# Patient Record
Sex: MALE | Race: WHITE
[De-identification: names, ages, dates, MRNs, and addresses within clinical notes are randomized per-mention and may not be internally consistent; named-entity substitution may affect disease eponyms.]

---

## 2018-03-03 ENCOUNTER — HOSPITAL ENCOUNTER (EMERGENCY)
Dept: HOSPITAL 41 - JD.ED | Age: 48
Discharge: PSYCHIATRIC OR SUBSTANCE USE FACILITY | End: 2018-03-03
Payer: SELF-PAY

## 2018-03-03 DIAGNOSIS — S01.512A: ICD-10-CM

## 2018-03-03 DIAGNOSIS — I10: ICD-10-CM
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
## 2018-03-03 DIAGNOSIS — R56.9: Primary | ICD-10-CM

## 2018-03-03 DIAGNOSIS — F10.230: ICD-10-CM

## 2018-03-03 PROCEDURE — 99285 EMERGENCY DEPT VISIT HI MDM: CPT

## 2018-03-03 PROCEDURE — 96375 TX/PRO/DX INJ NEW DRUG ADDON: CPT

## 2018-03-03 PROCEDURE — 96374 THER/PROPH/DIAG INJ IV PUSH: CPT

## 2018-03-03 PROCEDURE — 36415 COLL VENOUS BLD VENIPUNCTURE: CPT

## 2018-03-03 PROCEDURE — 82962 GLUCOSE BLOOD TEST: CPT

## 2018-03-03 PROCEDURE — 96376 TX/PRO/DX INJ SAME DRUG ADON: CPT

## 2018-03-03 PROCEDURE — 85025 COMPLETE CBC W/AUTO DIFF WBC: CPT

## 2018-03-03 PROCEDURE — 80306 DRUG TEST PRSMV INSTRMNT: CPT

## 2018-03-03 PROCEDURE — 96361 HYDRATE IV INFUSION ADD-ON: CPT

## 2018-03-03 PROCEDURE — 80053 COMPREHEN METABOLIC PANEL: CPT

## 2018-03-03 PROCEDURE — G0480 DRUG TEST DEF 1-7 CLASSES: HCPCS

## 2018-03-03 PROCEDURE — 70450 CT HEAD/BRAIN W/O DYE: CPT

## 2018-03-03 NOTE — EDM.PDOC
ED HPI GENERAL MEDICAL PROBLEM





- General


Chief Complaint: Neurological Problem


Stated Complaint: [facility]


Time Seen by Provider: 18 12:41


Source of Information: Reports: Patient


History Limitations: Reports: No Limitations





- History of Present Illness


INITIAL COMMENTS - FREE TEXT/NARRATIVE: 





The patient presents by [facility] for a seizure.  He was found 

outside in [place] and he had a seizure.  He had 2 of them on the way.  He has 

no history of seizures.  He does drink alcohol daily.  He last drank last 

night.  He has no medical problems.  He is alert and orientated when he arrived 

here.  He did bite his tongue.  He denies headache, neck pain, chest pain, 

shortness of breath, abdominal pain, or vomiting.


Onset: Sudden


Duration: Minutes:


Location: Reports: Generalized


Severity: Moderate


Improves with: Reports: None


Worsens with: Reports: None


Associated Symptoms: Reports: Nausea/Vomiting.  Denies: Chest Pain, Cough, Fever

/Chills, Headaches, Shortness of Breath





- Related Data


 Allergies











Allergy/AdvReac Type Severity Reaction Status Date / Time


 


No Known Allergies Allergy   Verified 18 12:42











Home Meds: 


 Home Meds





. [No Known Home Meds]  18 [History]











Past Medical History


Other HEENT History: blood in mouth, no obvious bite /injury to tounge noted.


Cardiovascular History: Reports: Hypertension





- Past Surgical History


Other Cardiovascular Surgeries/Procedures: States hx of HTN about one year ago 

but not put on any medication for it.





Social & Family History





- Family History


Family Medical History: Noncontributory





- Tobacco Use


Smoking Status *Q: Never Smoker





- Caffeine Use


Caffeine Use: Reports: Energy Drinks, Soda





- Alcohol Use


Date of Last Drink: 18





- Recreational Drug Use


Recreational Drug Use: Yes


Drug Use in Last 12 Months: No


Recreational Drug Type: Reports: Methamphetamine


Recreational Drug Use Frequency: Not Used In Over 6 Months





ED ROS GENERAL





- Review of Systems


Review Of Systems: See Below


Constitutional: Reports: No Symptoms


HEENT: Reports: No Symptoms


Respiratory: Reports: No Symptoms


Cardiovascular: Reports: No Symptoms


Endocrine: Reports: No Symptoms


GI/Abdominal: Reports: No Symptoms


: Reports: No Symptoms


Musculoskeletal: Reports: No Symptoms


Skin: Reports: No Symptoms


Neurological: Reports: Seizure





- Physical Exam


Exam: See Below


Exam Limited By: No Limitations


General Appearance: Alert, No Apparent Distress


Ears: Normal External Exam


Nose: Normal Inspection


Throat/Mouth: Other (Superficial laceration to the left side of his tongue)


Head Exam: Atraumatic, Normocephalic


Neck: Normal Inspection


Respiratory/Chest: No Respiratory Distress, Lungs Clear, Normal Breath Sounds


Cardiovascular: Regular Rate, Rhythm, No Edema, No Murmur


GI/Abdominal: Soft, Non-Tender, No Organomegaly, No Mass


Neuro Exam (Abbreviated): Alert, Oriented, No Motor/Sensory Deficits





Course





- Vital Signs


Last Recorded V/S: 


 Last Vital Signs











Temp  97.1 F   18 12:44


 


Pulse  161 H  18 12:44


 


Resp  27 H  18 12:44


 


BP  183/116 H  18 12:44


 


Pulse Ox  94 L  18 12:44














- Orders/Labs/Meds


Orders: 


 Active Orders 24 hr











 Category Date Time Status


 


 Peripheral IV Care [RC] .AS DIRECTED Care  18 12:42 Active


 


 Sodium Chloride 0.9% [Normal Saline] 1,000 ml Med  18 15:45 Active





 IV ASDIRECTED   


 


 Sodium Chloride 0.9% [Saline Flush] Med  18 12:42 Active





 10 ml FLUSH ASDIRECTED PRN   


 


 ED Antiemetic Medication Reflex [OM.PC] Stat Oth  18 12:42 Ordered


 


 Peripheral IV Insertion Adult [OM.PC] Stat Oth  18 12:42 Ordered








 Medication Orders





Sodium Chloride (Normal Saline)  1,000 mls @ 150 mls/hr IV ASDIRECTED CHINTAN


   Last Admin: 18 15:46  Dose: 150 mls/hr


Sodium Chloride (Saline Flush)  10 ml FLUSH ASDIRECTED PRN


   PRN Reason: Keep Vein Open


   Last Admin: 18 12:58  Dose: 10 ml








Labs: 


 Laboratory Tests











  18 Range/Units





  12:37 12:37 12:38 


 


WBC  12.80 H    (4.23-9.07)  K/mm3


 


RBC  4.49 L    (4.63-6.08)  M/mm3


 


Hgb  14.8    (13.7-17.5)  gm/L


 


Hct  45.0    (40.1-51.0)  %


 


MCV  100.2 H    (79.0-92.2)  fl


 


MCH  33.0 H    (25.7-32.2)  pg


 


MCHC  32.9    (32.2-35.5)  g/dl


 


RDW Std Deviation  46.9 H    (35.1-43.9)  fL


 


Plt Count  142 L    (163-337)  K/mm3


 


MPV  11.0    (9.4-12.3)  fl


 


Neut % (Auto)  72.8 H    (34.0-67.9)  %


 


Lymph % (Auto)  17.9 L    (21.8-53.1)  %


 


Mono % (Auto)  8.9    (5.3-12.2)  %


 


Eos % (Auto)  0 L    (0.8-7.0)  


 


Baso % (Auto)  0.1    (0.1-1.2)  %


 


Neut # (Auto)  9.32 H    (1.78-5.38)  K/mm3


 


Lymph # (Auto)  2.29    (1.32-3.57)  K/mm3


 


Mono # (Auto)  1.14 H    (0.30-0.82)  K/mm3


 


Eos # (Auto)  0.00 L    (0.04-0.54)  K/mm3


 


Baso # (Auto)  0.01    (0.01-0.08)  K/mm3


 


Sodium   136   (136-145)  mEq/L


 


Potassium   3.6   (3.5-5.1)  mEq/L


 


Chloride   95 L   ()  mEq/L


 


Carbon Dioxide   8 L*   (21-32)  mEq/L


 


Anion Gap   36.6 H   (5-15)  


 


BUN   5 L   (7-18)  mg/dL


 


Creatinine   1.6 H   (0.7-1.3)  mg/dL


 


Est Cr Clr Drug Dosing   65.20   mL/min


 


Estimated GFR (MDRD)   47   (>60)  mL/min


 


BUN/Creatinine Ratio   3.1 L   (14-18)  


 


Glucose   253 H   ()  mg/dL


 


POC Glucose    239 H  ()  mg/dL


 


Calcium   9.3   (8.5-10.1)  mg/dL


 


Total Bilirubin   1.3 H   (0.2-1.0)  mg/dL


 


AST   430 H   (15-37)  U/L


 


ALT   255 H   (16-63)  U/L


 


Alkaline Phosphatase   152 H   ()  U/L


 


Total Protein   8.6 H   (6.4-8.2)  g/dl


 


Albumin   4.0   (3.4-5.0)  g/dl


 


Globulin   4.6   gm/dL


 


Albumin/Globulin Ratio   0.9 L   (1-2)  


 


Urine Opiates Screen     (NEGATIVE)  


 


Ur Buprenorphine Scrn     (NEGATIVE)  


 


Ur Oxycodone Screen     (NEGATIVE)  


 


Urine Methadone Screen     (NEGATIVE)  


 


Ur Propoxyphene Screen     (NEGATIVE)  


 


Ur Barbiturates Screen     (NEGATIVE)  


 


Ur Tricyclics Screen     (NEGATIVE)  


 


Ur Phencyclidine Scrn     (NEGATIVE)  


 


Ur Amphetamine Screen     (NEGATIVE)  


 


U Methamphetamines Scrn     (NEGATIVE)  


 


U Benzodiazepines Scrn     (NEGATIVE)  


 


U Cocaine Metab Screen     (NEGATIVE)  


 


U Marijuana (THC) Screen     (NEGATIVE)  


 


Ethyl Alcohol   0.00   (0.00)  gm%














  18 Range/Units





  13:11 


 


WBC   (4.23-9.07)  K/mm3


 


RBC   (4.63-6.08)  M/mm3


 


Hgb   (13.7-17.5)  gm/L


 


Hct   (40.1-51.0)  %


 


MCV   (79.0-92.2)  fl


 


MCH   (25.7-32.2)  pg


 


MCHC   (32.2-35.5)  g/dl


 


RDW Std Deviation   (35.1-43.9)  fL


 


Plt Count   (163-337)  K/mm3


 


MPV   (9.4-12.3)  fl


 


Neut % (Auto)   (34.0-67.9)  %


 


Lymph % (Auto)   (21.8-53.1)  %


 


Mono % (Auto)   (5.3-12.2)  %


 


Eos % (Auto)   (0.8-7.0)  


 


Baso % (Auto)   (0.1-1.2)  %


 


Neut # (Auto)   (1.78-5.38)  K/mm3


 


Lymph # (Auto)   (1.32-3.57)  K/mm3


 


Mono # (Auto)   (0.30-0.82)  K/mm3


 


Eos # (Auto)   (0.04-0.54)  K/mm3


 


Baso # (Auto)   (0.01-0.08)  K/mm3


 


Sodium   (136-145)  mEq/L


 


Potassium   (3.5-5.1)  mEq/L


 


Chloride   ()  mEq/L


 


Carbon Dioxide   (21-32)  mEq/L


 


Anion Gap   (5-15)  


 


BUN   (7-18)  mg/dL


 


Creatinine   (0.7-1.3)  mg/dL


 


Est Cr Clr Drug Dosing   mL/min


 


Estimated GFR (MDRD)   (>60)  mL/min


 


BUN/Creatinine Ratio   (14-18)  


 


Glucose   ()  mg/dL


 


POC Glucose   ()  mg/dL


 


Calcium   (8.5-10.1)  mg/dL


 


Total Bilirubin   (0.2-1.0)  mg/dL


 


AST   (15-37)  U/L


 


ALT   (16-63)  U/L


 


Alkaline Phosphatase   ()  U/L


 


Total Protein   (6.4-8.2)  g/dl


 


Albumin   (3.4-5.0)  g/dl


 


Globulin   gm/dL


 


Albumin/Globulin Ratio   (1-2)  


 


Urine Opiates Screen  Negative  (NEGATIVE)  


 


Ur Buprenorphine Scrn  Negative  (NEGATIVE)  


 


Ur Oxycodone Screen  Negative  (NEGATIVE)  


 


Urine Methadone Screen  Negative  (NEGATIVE)  


 


Ur Propoxyphene Screen  Negative  (NEGATIVE)  


 


Ur Barbiturates Screen  Negative  (NEGATIVE)  


 


Ur Tricyclics Screen  Negative  (NEGATIVE)  


 


Ur Phencyclidine Scrn  Negative  (NEGATIVE)  


 


Ur Amphetamine Screen  Negative  (NEGATIVE)  


 


U Methamphetamines Scrn  Negative  (NEGATIVE)  


 


U Benzodiazepines Scrn  Negative  (NEGATIVE)  


 


U Cocaine Metab Screen  Negative  (NEGATIVE)  


 


U Marijuana (THC) Screen  Negative  (NEGATIVE)  


 


Ethyl Alcohol   (0.00)  gm%











Meds: 


Medications











Generic Name Dose Route Start Last Admin





  Trade Name Freq  PRN Reason Stop Dose Admin


 


Sodium Chloride  1,000 mls @ 150 mls/hr  18 15:45  18 15:46





  Normal Saline  IV   150 mls/hr





  ASDIRECTED CHINTAN   Administration


 


Sodium Chloride  10 ml  18 12:42  18 12:58





  Saline Flush  FLUSH   10 ml





  ASDIRECTED PRN   Administration





  Keep Vein Open   














Discontinued Medications














Generic Name Dose Route Start Last Admin





  Trade Name Freq  PRN Reason Stop Dose Admin


 


Al Hydroxide/Mg Hydroxide 30  0 ml  18 16:33  18 16:42





  ml/ Lidocaine HCl 15 ml  PO  18 16:34  45 ml





  ONETIME ONE   Administration


 


Sodium Chloride  1,000 mls @ 1,000 mls/hr  18 12:42  18 12:50





  Normal Saline  IV  18 13:41  1,000 mls/hr





  .BOLUS STA   Administration


 


Sodium Chloride  1,000 mls @ 1,000 mls/hr  18 14:07  18 14:16





  Normal Saline  IV  18 15:06  1,000 mls/hr





  ONETIME ONE   Administration


 


Lorazepam  1 mg  18 12:43  18 12:53





  Ativan  IVPUSH  18 12:44  1 mg





  ONETIME ONE   Administration


 


Lorazepam  1 mg  18 14:08  18 15:18





  Ativan  IVPUSH  18 14:09  1 mg





  ONETIME ONE   Administration


 


Lorazepam  1 mg  18 16:32  18 16:40





  Ativan  IVPUSH  18 16:33  1 mg





  ONETIME ONE   Administration


 


Ondansetron HCl  4 mg  18 12:42  18 12:57





  Zofran  IVPUSH  18 12:43  4 mg





  ONETIME ONE   Administration














- Re-Assessments/Exams


Free Text/Narrative Re-Assessment/Exam: 





18 14:14


I ordered an IV NS 1L bolus, ativan 1mg IV, labs, and CT of his head.  His WBC 

was elevated at 12.8.  His platelets are low at 142.  His CO2 is low at 8.  His 

creatinine is elevated at 1.6.  His glucose is elevated at 253.  His total bili 

is elevated at 1.3.  His AST is elevated at 430.  His ALT is elevated at 255.  

His alk phos is elevated at 152.  His UDS is negative.  HIs ETOH is at zero 

now.  His CT of his head shows an arachnoid cyst within the left posterior 

fossa.  Mild generalized atrophy.  No acute intracranial abnormality is 

identified on noncontrast head CT exam.  He is still shaking so I ordered 

ativan 1mg IV and another liter of fluids.


18 17:37


His uncle showed up along with some friends and they are all concerned about 

his drinking.  They are worried he could have  today.  [place] do agree [place] 

[place].  The patient does not want to stop drinking but I feel he 

needs help.  I called [facility] in [place] and talked with Dr Wilburn and she 

accepted the patient.  The patient will go by ambulance and a 's deputy.





Departure





- Departure


Time of Disposition: 18:00


Disposition: DC/[facility] to [facility]


Condition: Serious


Clinical Impression: 


 Seizure





Tongue laceration


Qualifiers:


 Encounter type: initial encounter Qualified Code(s): S01.512A - Laceration 

without foreign body of oral cavity, initial encounter





Alcohol withdrawal


Qualifiers:


 Complication of substance-induced condition: uncomplicated Qualified Code(s): 

F10.230 - Alcohol dependence with withdrawal, uncomplicated








- Discharge Information


Referrals: 


PCP,None [Primary Care Provider] - 


Forms:  ED Department Discharge





- My Orders


Last 24 Hours: 


My Active Orders





18 12:42


Peripheral IV Care [RC] .AS DIRECTED 


Sodium Chloride 0.9% [Saline Flush]   10 ml FLUSH ASDIRECTED PRN 


ED Antiemetic Medication Reflex [OM.PC] Stat 


Peripheral IV Insertion Adult [OM.PC] Stat 





18 15:45


Sodium Chloride 0.9% [Normal Saline] 1,000 ml IV ASDIRECTED 














- Assessment/Plan


Last 24 Hours: 


My Active Orders





18 12:42


Peripheral IV Care [RC] .AS DIRECTED 


Sodium Chloride 0.9% [Saline Flush]   10 ml FLUSH ASDIRECTED PRN 


ED Antiemetic Medication Reflex [OM.PC] Stat 


Peripheral IV Insertion Adult [OM.PC] Stat 





18 15:45


Sodium Chloride 0.9% [Normal Saline] 1,000 ml IV ASDIRECTED

## 2018-03-03 NOTE — CT
Head CT

 

Technique: Multiple axial sections through the brain were obtained.  

Intravenous contrast was not utilized.

 

Comparison: No previous intracranial imaging.

 

Findings: CSF collection is seen extra-axially within the left 

posterior fossa in the area of the left cerebellar hemisphere.  This 

is felt compatible with an arachnoid cyst measuring 4.9 cm by 

approximately 1.6 cm.  Ventricles along the basal cisterns and sulci 

over convexities are mildly prominent.  No abnormal parenchymal 

densities are seen.  No evidence of intracranial hemorrhage.  No 

midline shift or mass effect is seen.

 

Bone window settings were reviewed which shows no acute calvarial 

abnormality.  Visualized sinuses are clear.

 

Impression:

1.  Arachnoid cyst within the left posterior fossa.

2.  Mild generalized atrophy.

3.  No acute intracranial abnormality is identified on noncontrast 

head CT exam.

 

Diagnostic code #2

## 2021-08-29 ENCOUNTER — HOSPITAL ENCOUNTER (EMERGENCY)
Dept: HOSPITAL 41 - JD.ED | Age: 51
Discharge: NURSING, REHAB OR LONG-TERM CARE | End: 2021-08-29
Payer: MEDICAID

## 2021-08-29 DIAGNOSIS — K70.31: Primary | ICD-10-CM

## 2021-08-29 DIAGNOSIS — Z20.822: ICD-10-CM

## 2021-08-29 DIAGNOSIS — Z72.0: ICD-10-CM

## 2021-08-29 DIAGNOSIS — I10: ICD-10-CM

## 2021-08-29 PROCEDURE — 93005 ELECTROCARDIOGRAM TRACING: CPT

## 2021-08-29 PROCEDURE — 87635 SARS-COV-2 COVID-19 AMP PRB: CPT

## 2021-08-29 PROCEDURE — 85610 PROTHROMBIN TIME: CPT

## 2021-08-29 PROCEDURE — 87804 INFLUENZA ASSAY W/OPTIC: CPT

## 2021-08-29 PROCEDURE — 99285 EMERGENCY DEPT VISIT HI MDM: CPT

## 2021-08-29 PROCEDURE — 85025 COMPLETE CBC W/AUTO DIFF WBC: CPT

## 2021-08-29 PROCEDURE — 80053 COMPREHEN METABOLIC PANEL: CPT

## 2021-08-29 PROCEDURE — 82977 ASSAY OF GGT: CPT

## 2021-08-29 PROCEDURE — 74177 CT ABD & PELVIS W/CONTRAST: CPT

## 2021-08-29 PROCEDURE — 85730 THROMBOPLASTIN TIME PARTIAL: CPT

## 2021-08-29 PROCEDURE — 82247 BILIRUBIN TOTAL: CPT

## 2021-08-29 PROCEDURE — 81001 URINALYSIS AUTO W/SCOPE: CPT

## 2021-08-29 PROCEDURE — 86140 C-REACTIVE PROTEIN: CPT

## 2021-08-29 PROCEDURE — 83690 ASSAY OF LIPASE: CPT

## 2021-08-29 PROCEDURE — 82248 BILIRUBIN DIRECT: CPT

## 2021-08-29 PROCEDURE — 96374 THER/PROPH/DIAG INJ IV PUSH: CPT

## 2021-08-29 PROCEDURE — 36415 COLL VENOUS BLD VENIPUNCTURE: CPT

## 2021-08-29 PROCEDURE — 83735 ASSAY OF MAGNESIUM: CPT

## 2021-08-29 PROCEDURE — U0002 COVID-19 LAB TEST NON-CDC: HCPCS

## 2021-08-29 PROCEDURE — 96375 TX/PRO/DX INJ NEW DRUG ADDON: CPT

## 2021-08-29 RX ADMIN — Medication PRN ML: at 14:28

## 2021-08-29 RX ADMIN — Medication PRN ML: at 13:33

## 2021-08-29 NOTE — CT
CT abdomen and pelvis

 

Technique: Multiple axial sections were obtained from above the dome 

of the diaphragm inferiorly through the pubic symphysis.  Intravenous 

and oral contrast were utilized.  Delayed images were also obtained 

through the abdomen and pelvis.  Reconstructed coronal and sagittal 

images were obtained.

 

Comparison: No prior abdominal imaging is available.

 

Findings: Visualized lung bases show nothing acute.

 

Liver is small in size compatible with cirrhosis.  Moderate amount of 

ascites is seen around the upper abdomen extending down the paracolic 

gutters into the pelvis.  Spleen size is normal.  Minimal contrast is 

seen within the esophagus presumably due to reflux.

 

Liver shows no focal parenchymal abnormality.  Gallbladder contains no

 calcified gallstones.  Pancreas is within normal limits.  Adrenal 

glands show no nodule.

 

Kidneys show symmetric contrast enhancement.  No hydronephrosis or 

mass is seen.  Delayed images show contrast excretion from both 

kidneys.  Two ureters are noted on the right side which join distally.

  Single ureter is seen on the left side.  Contrast is noted within 

all ureters and bladder.

 

Abdominal aorta shows no aneurysm.  No retroperitoneal adenopathy or 

discrete mesenteric abnormalities are seen.  Diverticuli are seen 

within the sigmoid colon.  No discrete inflammatory change is seen.  

Appendix is not visualized with certainty.  Slight bowel wall 

thickening is seen within the right colon most likely relating to the 

ascites.

 

Focal fluid collections are seen within the subcutaneous tissues over 

both hips compatible with soft tissue edema.

 

Bone window settings were reviewed which show slight degenerative 

change within the spine.  No acute osseous abnormality is appreciated.

 

Impression:

1.  Small liver compatible with cirrhosis.  Moderate amount of ascites

 is seen.

2.  Small amount of gastroesophageal reflux is noted.

3.  Bowel wall thickening within the right colon most likely relating 

to the patient's ascites.

4.  Other findings believed to be incidental as noted above.

 

Diagnostic code #3

## 2021-08-29 NOTE — EDM.PDOC
ED HPI GENERAL MEDICAL PROBLEM





- General


Chief Complaint: Abdominal Pain


Stated Complaint: constipation swollen ankles and adominal pain


Time Seen by Provider: 08/29/21 12:25


Source of Information: Reports: Patient, RN Notes Reviewed


History Limitations: Reports: No Limitations





- History of Present Illness


INITIAL COMMENTS - FREE TEXT/NARRATIVE: 





Patient is a 51-year-old male who presents to the ER for the evaluation of his 

abdomen discomfort, and leg swelling.  The patient states that he is an everyday

alcohol drinker, and he has been doing this for many years.  States he drinks 

about 6 beers per day and usually never misses a day.  Last alcohol intake was 

yesterday afternoon.  He comes in today because he is concerned about the size 

of his abdomen, and the size of his legs, he states that it is getting harder 

and harder to do daily activities, when he is showering, is hard to bend over to

wash his feet or legs.  He has a very protuberant abdomen, and at least 3-4+ 

pitting edema on his bilateral extremities, that seems to extend up to about his

mid thighs.  He is denying any swelling into his scrotum at this time.  He 

states that he is not short of breath, he is having no cough, no fevers or 

chills.  States that he slightly nauseous but no vomiting or diarrhea.  States 

that he actually feels quite constipated he is not had a normal bowel movement 

in the last 3 weeks or so.  He has been trying to use laxatives, and enemas to 

try to provide bowel movements, but nothing seems to be working.  States that 

the fluid retention seems to have started roughly 2 months ago.  States that his

weight then was around 180 pounds, and now he is up to 200 pounds.  He has no 

regular provider.  He takes no regular medications.


  ** Abdomen


Pain Score (Numeric/FACES): 7





- Related Data


                                    Allergies











Allergy/AdvReac Type Severity Reaction Status Date / Time


 


No Known Allergies Allergy   Verified 03/03/18 12:42











Home Meds: 


                                    Home Meds





. [No Known Home Meds]  03/03/18 [History]











Past Medical History


HEENT History: Reports: Other (See Below)


Other HEENT History: blood in mouth, no obvious bite /injury to tounge noted.


Cardiovascular History: Reports: Hypertension (but not medicated)


Gastrointestinal History: Reports: Jaundice, Other (See Below)


Other Gastrointestinal History: ascites; liver issues


Psychiatric History: Reports: Addiction, Anxiety





- Infectious Disease History


Infectious Disease History: Reports: Chicken Pox





Social & Family History





- Family History


Family Medical History: No Pertinent Family History





- Tobacco Use


Tobacco Use Status *Q: Current Every Day Tobacco User


Years of Tobacco use: 28


Packs/Tins Daily: 0.7





- Caffeine Use


Caffeine Use: Reports: Energy Drinks, Soda





- Alcohol Use


Alcohol Use History: Yes


Days Per Week of Alcohol Use: 7


Number of Drinks Per Day: 6


Total Drinks Per Week: 42


Alcohol Use in Last Twelve Months: Yes


Alcohol Use Frequency: Daily





ED ROS GENERAL





- Review of Systems


Review Of Systems: Comprehensive ROS is negative, except as noted in HPI.





ED EXAM, GI/ABD





- Physical Exam


Exam: See Below


Exam Limited By: No Limitations


General Appearance: Alert, WD/WN, No Apparent Distress


Eyes: Bilateral: EOMI (bilateral eyes with scleral icterus)


Respiratory/Chest: No Respiratory Distress, Lungs Clear, Normal Breath Sounds, 

No Accessory Muscle Use, Chest Non-Tender


Cardiovascular: Normal Peripheral Pulses, Regular Rate, Rhythm, No Edema


GI/Abdominal Exam: Normal Bowel Sounds, Soft, Distended (generalized; abdomen is

 protuberant), Tender (very slightly to palpation-pt states that his whole 

abomen feels tense)


Extremities: Normal Inspection, Normal Capillary Refill


Neurological: Alert, Oriented, Normal Cognition, No Motor/Sensory Deficits


Psychiatric: Normal Affect, Normal Mood


Skin Exam: Warm, Dry, Intact, Normal Color, No Rash


  ** #1 Interpretation


EKG Date: 08/29/21


Time: 13:35


Rhythm: NSR (sinus tach)


Rate (Beats/Min): 101


Axis: LAD-Left Axis Deviation (Borderline IVCD with LAD)


P-Wave: Present


QRS: Normal


ST-T: Normal


QT: Normal


Comparison: NA - No Prior EKG


EKG Interpretation Comments: 





No obvious ischemia or acute ST changes noted, reviewed by myself and Dr. Morales.





Course





- Vital Signs


Last Recorded V/S: 


                                Last Vital Signs











Temp  98.2 F   08/29/21 12:37


 


Pulse  110 H  08/29/21 12:37


 


Resp  20   08/29/21 12:37


 


BP  167/95 H  08/29/21 12:37


 


Pulse Ox  97   08/29/21 12:37














- Orders/Labs/Meds


Orders: 


                               Active Orders 24 hr











 Category Date Time Status


 


 Peripheral IV Care [RC] .AS DIRECTED Care  08/29/21 12:48 Active


 


 INR,PT,PROTHROMBIN TIME [COAG] Stat Lab  08/29/21 15:15 Ordered


 


 MAGNESIUM [CHEM] Stat Lab  08/29/21 15:22 Ordered


 


 PTT,PARTIAL THROMBOPLSTIN TIME [COAG] Stat Lab  08/29/21 15:15 Ordered


 


 Sodium Chloride 0.9% [Saline Flush] Med  08/29/21 12:48 Active





 10 ml FLUSH ASDIRECTED PRN   


 


 Sodium Chloride 0.9% [Saline Flush] Med  08/29/21 13:18 Active





 10 ml FLUSH ONETIME PRN   


 


 Peripheral IV Insertion Adult [OM.PC] Stat Oth  08/29/21 12:48 Ordered








                                Medication Orders





Sodium Chloride (Sodium Chloride 0.9% 10 Ml Syringe)  10 ml FLUSH ASDIRECTED PRN


   PRN Reason: Keep Vein Open


   Last Admin: 08/29/21 13:11  Dose: 10 ml


   Documented by: MICHELE


Sodium Chloride (Sodium Chloride 0.9% 10 Ml Syringe)  10 ml FLUSH ONETIME PRN


   PRN Reason: Keep Vein Open


   Last Admin: 08/29/21 14:28  Dose: 10 ml


   Documented by: ZULEIKA


   Admin: 08/29/21 13:33  Dose: 10 ml


   Documented by: ROWAN








Labs: 


                                Laboratory Tests











  08/29/21 08/29/21 08/29/21 Range/Units





  12:40 12:40 12:40 


 


WBC  19.20 H    (4.23-9.07)  K/mm3


 


RBC  3.75 L    (4.63-6.08)  M/mm3


 


Hgb  14.2    (13.7-17.5)  gm/dl


 


Hct  39.1 L    (40.1-51.0)  %


 


MCV  104.3 H D    (79.0-92.2)  fl


 


MCH  37.9 H    (25.7-32.2)  pg


 


MCHC  36.3 H    (32.2-35.5)  g/dl


 


RDW Std Deviation  63.0 H    (35.1-43.9)  fL


 


Plt Count  233  D    (163-337)  K/mm3


 


MPV  10.5    (9.4-12.3)  fl


 


Neut % (Auto)  76.7 H    (34.0-67.9)  %


 


Lymph % (Auto)  10.4 L    (21.8-53.1)  %


 


Mono % (Auto)  12.2    (5.3-12.2)  %


 


Eos % (Auto)  0 L    (0.8-7.0)  


 


Baso % (Auto)  0.2    (0.1-1.2)  %


 


Neut # (Auto)  14.72 H    (1.78-5.38)  K/mm3


 


Lymph # (Auto)  2.00    (1.32-3.57)  K/mm3


 


Mono # (Auto)  2.35 H    (0.30-0.82)  K/mm3


 


Eos # (Auto)  0.00 L    (0.04-0.54)  K/mm3


 


Baso # (Auto)  0.03    (0.01-0.08)  K/mm3


 


Manual Slide Review  Abnormal smear    


 


Sodium   128 L   (136-145)  mEq/L


 


Potassium   2.4 L*   (3.5-5.1)  mEq/L


 


Chloride   89 L   ()  mEq/L


 


Carbon Dioxide   35 H D   (21-32)  mEq/L


 


Anion Gap   6.4   (5-15)  


 


BUN   6 L   (7-18)  mg/dL


 


Creatinine   1.2   (0.7-1.3)  mg/dL


 


Est Cr Clr Drug Dosing   80.83   mL/min


 


Estimated GFR (MDRD)   > 60   (>60)  mL/min


 


BUN/Creatinine Ratio   5.0 L   (14-18)  


 


Glucose   102 H   (70-99)  mg/dL


 


Calcium   7.5 L D   (8.5-10.1)  mg/dL


 


Total Bilirubin   7.5 H  7.4 H  (0.2-1.0)  mg/dL


 


Direct Bilirubin    3.90 H  (0.0-0.2)  mg/dl


 


Indirect Bilirubin    3.50  


 


GGT   191 H   (15-85)  U/L


 


AST   146 H   (15-37)  U/L


 


ALT   40   (16-63)  U/L


 


Alkaline Phosphatase   189 H   ()  U/L


 


C-Reactive Protein   8.6 H*   (<1.0)  mg/dL


 


Total Protein   8.3 H   (6.4-8.2)  g/dl


 


Albumin   1.9 L   (3.4-5.0)  g/dl


 


Globulin   6.4   gm/dL


 


Albumin/Globulin Ratio   0.3 L   (1-2)  


 


Lipase   141   ()  U/L


 


Urine Color     (Yellow)  


 


Urine Appearance     (Clear)  


 


Urine pH     (5.0-8.0)  


 


Ur Specific Gravity     (1.005-1.030)  


 


Urine Protein     (Negative)  


 


Urine Glucose (UA)     (Negative)  


 


Urine Ketones     (Negative)  


 


Urine Occult Blood     (Negative)  


 


Urine Nitrite     (Negative)  


 


Urine Bilirubin     (Negative)  


 


Urine Urobilinogen     (0.2-1.0)  


 


Ur Leukocyte Esterase     (Negative)  


 


U Hyaline Cast (Auto)     (0-5)  /lpf


 


Urine RBC     (0-5)  /hpf


 


Urine WBC     (0-5)  /hpf


 


Ur Epithelial Cells     (0-5)  /hpf


 


Urine Bacteria     (FEW)  /hpf


 


Urine Mucus     (FEW)  /hpf


 


SARS-CoV-2 RNA (LALO)     (NEGATIVE)  














  08/29/21 08/29/21 Range/Units





  13:15 14:05 


 


WBC    (4.23-9.07)  K/mm3


 


RBC    (4.63-6.08)  M/mm3


 


Hgb    (13.7-17.5)  gm/dl


 


Hct    (40.1-51.0)  %


 


MCV    (79.0-92.2)  fl


 


MCH    (25.7-32.2)  pg


 


MCHC    (32.2-35.5)  g/dl


 


RDW Std Deviation    (35.1-43.9)  fL


 


Plt Count    (163-337)  K/mm3


 


MPV    (9.4-12.3)  fl


 


Neut % (Auto)    (34.0-67.9)  %


 


Lymph % (Auto)    (21.8-53.1)  %


 


Mono % (Auto)    (5.3-12.2)  %


 


Eos % (Auto)    (0.8-7.0)  


 


Baso % (Auto)    (0.1-1.2)  %


 


Neut # (Auto)    (1.78-5.38)  K/mm3


 


Lymph # (Auto)    (1.32-3.57)  K/mm3


 


Mono # (Auto)    (0.30-0.82)  K/mm3


 


Eos # (Auto)    (0.04-0.54)  K/mm3


 


Baso # (Auto)    (0.01-0.08)  K/mm3


 


Manual Slide Review    


 


Sodium    (136-145)  mEq/L


 


Potassium    (3.5-5.1)  mEq/L


 


Chloride    ()  mEq/L


 


Carbon Dioxide    (21-32)  mEq/L


 


Anion Gap    (5-15)  


 


BUN    (7-18)  mg/dL


 


Creatinine    (0.7-1.3)  mg/dL


 


Est Cr Clr Drug Dosing    mL/min


 


Estimated GFR (MDRD)    (>60)  mL/min


 


BUN/Creatinine Ratio    (14-18)  


 


Glucose    (70-99)  mg/dL


 


Calcium    (8.5-10.1)  mg/dL


 


Total Bilirubin    (0.2-1.0)  mg/dL


 


Direct Bilirubin    (0.0-0.2)  mg/dl


 


Indirect Bilirubin    


 


GGT    (15-85)  U/L


 


AST    (15-37)  U/L


 


ALT    (16-63)  U/L


 


Alkaline Phosphatase    ()  U/L


 


C-Reactive Protein    (<1.0)  mg/dL


 


Total Protein    (6.4-8.2)  g/dl


 


Albumin    (3.4-5.0)  g/dl


 


Globulin    gm/dL


 


Albumin/Globulin Ratio    (1-2)  


 


Lipase    ()  U/L


 


Urine Color   Debra H  (Yellow)  


 


Urine Appearance   Clear  (Clear)  


 


Urine pH   5.5  (5.0-8.0)  


 


Ur Specific Gravity   > or = 1.030  (1.005-1.030)  


 


Urine Protein   1+ H  (Negative)  


 


Urine Glucose (UA)   Trace H  (Negative)  


 


Urine Ketones   Trace H  (Negative)  


 


Urine Occult Blood   Trace-intact H  (Negative)  


 


Urine Nitrite   Negative  (Negative)  


 


Urine Bilirubin   3+ H  (Negative)  


 


Urine Urobilinogen   2.0 H  (0.2-1.0)  


 


Ur Leukocyte Esterase   Negative  (Negative)  


 


U Hyaline Cast (Auto)   40-50 H  (0-5)  /lpf


 


Urine RBC   0-5  (0-5)  /hpf


 


Urine WBC   0-5  (0-5)  /hpf


 


Ur Epithelial Cells   Not seen  (0-5)  /hpf


 


Urine Bacteria   Many H  (FEW)  /hpf


 


Urine Mucus   Many H  (FEW)  /hpf


 


SARS-CoV-2 RNA (LALO)  Negative   (NEGATIVE)  











Meds: 


Medications











Generic Name Dose Route Start Last Admin





  Trade Name Freq  PRN Reason Stop Dose Admin


 


Sodium Chloride  10 ml  08/29/21 12:48  08/29/21 13:11





  Sodium Chloride 0.9% 10 Ml Syringe  FLUSH   10 ml





  ASDIRECTED PRN   Administration





  Keep Vein Open  


 


Sodium Chloride  10 ml  08/29/21 13:18  08/29/21 14:28





  Sodium Chloride 0.9% 10 Ml Syringe  FLUSH   10 ml





  ONETIME PRN   Administration





  Keep Vein Open  














Discontinued Medications














Generic Name Dose Route Start Last Admin





  Trade Name Fernando  PRN Reason Stop Dose Admin


 


Diatrizoate Meglum/Diatrizoate Sod  40 ml  08/29/21 13:18  08/29/21 14:28





  Diatrizoate Meglumine/Diatrizoate Sodium 37% 120 Ml Bottle  PO  08/29/21 13:19

  40 ml





  ONETIME ONE   Administration


 


Furosemide  40 mg  08/29/21 12:54  08/29/21 13:25





  Furosemide 40 Mg/4 Ml Vial  IVPUSH  08/29/21 12:55  Not Given





  NOW ONE  


 


Iopamidol  100 ml  08/29/21 13:18  08/29/21 14:28





  Iopamidol 612 Mg/Ml 100 Ml Bottle  IVPUSH  08/29/21 13:19  100 ml





  ONETIME ONE   Administration


 


Ondansetron HCl  4 mg  08/29/21 12:48  08/29/21 13:11





  Ondansetron 4 Mg/2 Ml Sdv  IVPUSH  08/29/21 12:49  4 mg





  ONETIME ONE   Administration


 


Potassium Chloride  40 meq  08/29/21 15:22 





  Potassium Chloride 20 Meq Tab.Er  PO  08/29/21 15:23 





  ONETIME ONE  


 


Spironolactone  100 mg  08/29/21 12:54  08/29/21 13:30





  Spironolactone 100 Mg Tab  PO  08/29/21 12:55  100 mg





  ONETIME ONE   Administration














- Re-Assessments/Exams


Free Text/Narrative Re-Assessment/Exam: 





08/29/21 13:02


Patient presents to the ER for the evaluation of his abdomen pain, and leg 

swelling.  It is fairly clinically obvious, that he is suffering from ascites, 

with possible jaundice, the sclera of his eyes are slightly yellow as well.  We 

will go ahead and get some labs, and get abdomen pelvis CT with oral and IV 

contrast for ongoing management, we will start the patient on furosemide and 

spironolactone initially after the CMP has been resulted so we can check 

electrolyte levels.





08/29/21 13:29


Patient's laboratory evaluation has resulted for the most part, CBC demonstrates

 a white count elevated at 19.2 with 76% neutrophils.  There are some macrocytic

 changes to his white count however he is not anemic.  Metabolic panel 

demonstrates a sodium low at 128, potassium low at 2.4, total bilirubin high at 

7.5, GGT high at 191, AST high at 146, ALP high 189, CRP is high at 8.6, albumin

 is low at 1.9, lipase is within normal limits at this time.  I did initially 

order Lasix and spironolactone for initial management, however we will hold off 

on the Lasix.  We will also get a EKG just because of the hypokalemia changes 

for further evaluation as well.  Very likely this patient would necessitate 

hospitalization for ongoing management due to cirrhosis and his ascites.





08/29/21 14:04


The patient's EKG demonstrates no sign of acute ST change or changes from the 

potassium levels this was reviewed by myself and Dr. Morales.  The patient's 

indirect and direct bilirubin have been done, the direct bilirubin is elevated 

at 3.9, indirect is resulted at 3.5 at this time.





08/29/21 14:50


At around 1440, the patient did have to urgently use the restroom, and bolted 

out of his bed, and ended up falling down onto his knees, in the ER hallway and 

then made himself to the bathroom.  He states he did not hit his head, and is 

not having pain in his knees or otherwise.  He was helped to the bathroom after 

the initial fall, he was moving too quickly in order for me to help ambulate him

 to the bathroom initially.  This fall was visualized by myself.





08/29/21 15:24


CT report does demonstrate cirrhosis with moderate ascites.  No other major 

intra-abdominal changes at this time.  I did call [facility] in [place], and 

talked with GI, Dr. Dominguez, and the hospitalist Dr. Bright and they did 

ultimately accept the patient in transfer.  They did request a PT/INR, and 

magnesium level to be obtained, if the patient was going to be  detained here 

for small amount of time so we can start supplementation if needed.  Dr. Bright 

also asked if we could give 40 mg once oral potassium and this has been ordered.











Departure





- Departure


Time of Disposition: 15:25


Disposition: DC/Tfer to [facility]


Condition: Fair


Clinical Impression: 


Cirrhosis of liver with ascites


Qualifiers:


 Hepatic cirrhosis type: alcoholic cirrhosis Qualified Code(s): K70.31 - 

Alcoholic cirrhosis of liver with ascites








- Discharge Information


*PRESCRIPTION DRUG MONITORING PROGRAM REVIEWED*: No


*COPY OF PRESCRIPTION DRUG MONITORING REPORT IN PATIENT CASSIE: No


Referrals: 


PCP,None [Primary Care Provider] - 


Forms:  ED Department Discharge





Sepsis Event Note (ED)





- Focused Exam


Vital Signs: 


                                   Vital Signs











  Temp Pulse Resp BP Pulse Ox


 


 08/29/21 12:37  98.2 F  110 H  20  167/95 H  97














- My Orders


Last 24 Hours: 


My Active Orders





08/29/21 12:48


Peripheral IV Care [RC] .AS DIRECTED 


Sodium Chloride 0.9% [Saline Flush]   10 ml FLUSH ASDIRECTED PRN 


Peripheral IV Insertion Adult [OM.PC] Stat 





08/29/21 13:18


Sodium Chloride 0.9% [Saline Flush]   10 ml FLUSH ONETIME PRN 





08/29/21 15:15


INR,PT,PROTHROMBIN TIME [COAG] Stat 


PTT,PARTIAL THROMBOPLSTIN TIME [COAG] Stat 





08/29/21 15:22


MAGNESIUM [CHEM] Stat 














- Assessment/Plan


Last 24 Hours: 


My Active Orders





08/29/21 12:48


Peripheral IV Care [RC] .AS DIRECTED 


Sodium Chloride 0.9% [Saline Flush]   10 ml FLUSH ASDIRECTED PRN 


Peripheral IV Insertion Adult [OM.PC] Stat 





08/29/21 13:18


Sodium Chloride 0.9% [Saline Flush]   10 ml FLUSH ONETIME PRN 





08/29/21 15:15


INR,PT,PROTHROMBIN TIME [COAG] Stat 


PTT,PARTIAL THROMBOPLSTIN TIME [COAG] Stat 





08/29/21 15:22


MAGNESIUM [CHEM] Stat